# Patient Record
Sex: FEMALE | URBAN - METROPOLITAN AREA
[De-identification: names, ages, dates, MRNs, and addresses within clinical notes are randomized per-mention and may not be internally consistent; named-entity substitution may affect disease eponyms.]

---

## 2023-01-06 ENCOUNTER — MEDICAL CORRESPONDENCE (OUTPATIENT)
Dept: TRANSPLANT | Facility: CLINIC | Age: 58
End: 2023-01-06

## 2023-11-24 ENCOUNTER — TELEPHONE (OUTPATIENT)
Dept: TRANSPLANT | Facility: CLINIC | Age: 58
End: 2023-11-24
Payer: COMMERCIAL

## 2023-11-24 DIAGNOSIS — E85.9 AMYLOIDOSIS (H): Primary | ICD-10-CM

## 2023-12-05 ENCOUNTER — MEDICAL CORRESPONDENCE (OUTPATIENT)
Dept: TRANSPLANT | Facility: CLINIC | Age: 58
End: 2023-12-05
Payer: COMMERCIAL

## 2024-01-05 ENCOUNTER — TELEPHONE (OUTPATIENT)
Dept: TRANSPLANT | Facility: CLINIC | Age: 59
End: 2024-01-05
Payer: COMMERCIAL

## 2024-01-05 ENCOUNTER — CARE COORDINATION (OUTPATIENT)
Dept: TRANSPLANT | Facility: CLINIC | Age: 59
End: 2024-01-05

## 2024-01-05 NOTE — PROGRESS NOTES
[facility]MT [facility] Cell [facility]  RN Coordinator Pre-Visit Documentation      Sapna Yancey is a 58 year old female who has been referred to the [facility] for hematopoietic cell transplant or immune effector cell therapy.      Referring MD Name: Suzan Alcantar, telephone [phone]    Reason for referral: Auto consult for amyloidosis    Link to BMT & CT Program Algorithms    All relevant clinical notes, labs, imaging, and pathology may be reviewed in Epic Bookmarks under name: Kaleb Montes De Oca      Patient Care Team    No active team members.           Kaleb Montes De Oca, RN

## 2024-01-06 ENCOUNTER — MEDICAL CORRESPONDENCE (OUTPATIENT)
Dept: TRANSPLANT | Facility: CLINIC | Age: 59
End: 2024-01-06
Payer: COMMERCIAL

## 2024-01-07 PROBLEM — E85.4 ORGAN-LIMITED AMYLOIDOSIS (H): Status: ACTIVE | Noted: 2023-03-16

## 2024-01-07 PROBLEM — R19.8 ABNORMAL FINDINGS ON ESOPHAGOGASTRODUODENOSCOPY (EGD): Status: ACTIVE | Noted: 2023-03-13

## 2024-01-07 PROBLEM — I10 ESSENTIAL HYPERTENSION WITH GOAL BLOOD PRESSURE LESS THAN 140/90: Status: ACTIVE | Noted: 2017-02-09

## 2024-01-07 PROBLEM — R63.4 WEIGHT LOSS, UNINTENTIONAL: Status: ACTIVE | Noted: 2022-12-02

## 2024-02-12 NOTE — PROGRESS NOTES
[facility]MT and Cell Therapy Program  RN Coordinator Pre-Visit Documentation      Sapna Yancey is a 58 year old female who has been referred to the [facility]MT [facility] Cell Therapy Program for hematopoietic cell transplant or immune effector cell therapy.      Referring MD Name: Suzan Alcantar MD, telephone [phone]    Referring RN Coordinator: [facility]      Reason for referral: Amyloidosis, auto referral    Link to BMT & CT Program Algorithms      All relevant clinical notes, labs, imaging, and pathology may be reviewed in Epic Bookmarks under name: Katie Bailey RN      Patient Care Team    No active team members.           Katie Bailey RN

## 2024-08-21 ENCOUNTER — CARE COORDINATION (OUTPATIENT)
Dept: TRANSPLANT | Facility: CLINIC | Age: 59
End: 2024-08-21
Payer: COMMERCIAL

## 2024-08-21 NOTE — PROGRESS NOTES
[facility] BMT and Cell Therapy Program  RN Coordinator Pre-Visit Documentation      Sapna Yancey is a 58 year old female who has been referred to the [facility] BMT and Cell Therapy Program for hematopoietic cell transplant or immune effector cell therapy.      Referring MD Name: Suzan Alcantar MD, telephone [phone]     Referring RN Coordinator: [facility]    Reason for referral: amyloidosis, auto referral     Link to BMT & CT Program Algorithms      All relevant clinical notes, labs, imaging, and pathology may be reviewed in Epic Bookmarks under name: Katie Bailey RN      Patient Care Team    No active team members.           Katie Bailey RN

## 2025-05-29 ENCOUNTER — TELEPHONE (OUTPATIENT)
Dept: TRANSPLANT | Facility: CLINIC | Age: 60
End: 2025-05-29
Payer: COMMERCIAL